# Patient Record
Sex: FEMALE | Race: WHITE | NOT HISPANIC OR LATINO
[De-identification: names, ages, dates, MRNs, and addresses within clinical notes are randomized per-mention and may not be internally consistent; named-entity substitution may affect disease eponyms.]

---

## 2021-10-30 ENCOUNTER — TRANSCRIPTION ENCOUNTER (OUTPATIENT)
Age: 71
End: 2021-10-30

## 2022-07-11 PROBLEM — Z00.00 ENCOUNTER FOR PREVENTIVE HEALTH EXAMINATION: Status: ACTIVE | Noted: 2022-07-11

## 2022-07-12 ENCOUNTER — APPOINTMENT (OUTPATIENT)
Dept: ORTHOPEDIC SURGERY | Facility: CLINIC | Age: 72
End: 2022-07-12

## 2022-07-12 DIAGNOSIS — M89.9 DISORDER OF BONE, UNSPECIFIED: ICD-10-CM

## 2022-07-12 DIAGNOSIS — M72.2 PLANTAR FASCIAL FIBROMATOSIS: ICD-10-CM

## 2022-07-12 DIAGNOSIS — M87.00 IDIOPATHIC ASEPTIC NECROSIS OF UNSPECIFIED BONE: ICD-10-CM

## 2022-07-12 DIAGNOSIS — G89.29 PAIN IN LEFT ANKLE AND JOINTS OF LEFT FOOT: ICD-10-CM

## 2022-07-12 DIAGNOSIS — M25.572 PAIN IN LEFT ANKLE AND JOINTS OF LEFT FOOT: ICD-10-CM

## 2022-07-12 DIAGNOSIS — M94.9 DISORDER OF BONE, UNSPECIFIED: ICD-10-CM

## 2022-07-12 PROCEDURE — 73610 X-RAY EXAM OF ANKLE: CPT | Mod: LT

## 2022-07-12 PROCEDURE — 99203 OFFICE O/P NEW LOW 30 MIN: CPT

## 2022-07-12 NOTE — HISTORY OF PRESENT ILLNESS
[de-identified] : C/o pain in the left ankle. Problem started about 6 months ago. Patient complains of constant pain in the ankle worse when ambulating. She states the problem is worse when getting up from seated position. Patient states the problem is worsening over time. She complains of sharp shooting pain.

## 2022-07-12 NOTE — PHYSICAL EXAM
[Left] : left foot and ankle [NL (40)] : plantar flexion 40 degrees [NL 30)] : inversion 30 degrees [NL (20)] : eversion 20 degrees [5___] : eversion 5[unfilled]/5 [Weight -] : weightbearing [There are no fractures, subluxations or dislocations. No significant abnormalities are seen] : There are no fractures, subluxations or dislocations. No significant abnormalities are seen [Degenerative change] : Degenerative change [] : negative [place] test [[email or web address]] : neal

## 2022-07-13 ENCOUNTER — FORM ENCOUNTER (OUTPATIENT)
Age: 72
End: 2022-07-13

## 2023-05-10 ENCOUNTER — OFFICE (OUTPATIENT)
Dept: URBAN - METROPOLITAN AREA CLINIC 8 | Facility: CLINIC | Age: 73
Setting detail: OPHTHALMOLOGY
End: 2023-05-10
Payer: MEDICARE

## 2023-05-10 DIAGNOSIS — H43.811: ICD-10-CM

## 2023-05-10 DIAGNOSIS — Z96.1: ICD-10-CM

## 2023-05-10 DIAGNOSIS — H26.493: ICD-10-CM

## 2023-05-10 PROCEDURE — 92014 COMPRE OPH EXAM EST PT 1/>: CPT | Performed by: OPHTHALMOLOGY

## 2023-05-10 ASSESSMENT — REFRACTION_CURRENTRX
OS_SPHERE: -2.25
OD_SPHERE: -2.75
OS_CYLINDER: -0.25
OD_CYLINDER: -0.25
OD_VPRISM_DIRECTION: SV
OS_VPRISM_DIRECTION: SV
OD_AXIS: 101
OS_OVR_VA: 20/
OS_AXIS: 168
OD_OVR_VA: 20/

## 2023-05-10 ASSESSMENT — SPHEQUIV_DERIVED
OD_SPHEQUIV: -3.375
OS_SPHEQUIV: 0.125

## 2023-05-10 ASSESSMENT — REFRACTION_MANIFEST
OD_VA1: 20/20
OD_CYLINDER: -0.75
OD_CYLINDER: SPHERE
OS_SPHERE: -2.75
OS_ADD: +2.00
OU_VA: 20/20
OD_SPHERE: PLANO
OD_VA2: 20/20(J1+)
OS_VA1: 20/25
OS_VA2: 20/20(J1+)
OD_ADD: +2.00
OD_SPHERE: -3.00
OS_CYLINDER: SPHERE
OS_SPHERE: PLANO
OD_AXIS: 100
OS_CYLINDER: SPH

## 2023-05-10 ASSESSMENT — REFRACTION_AUTOREFRACTION
OS_SPHERE: +0.25
OS_CYLINDER: -0.25
OD_SPHERE: ++1.25
OD_AXIS: 097
OD_CYLINDER: -0.50
OS_AXIS: 029

## 2023-05-10 ASSESSMENT — KERATOMETRY
OD_K1POWER_DIOPTERS: 43.25
OD_K2POWER_DIOPTERS: 43.25
OS_K1POWER_DIOPTERS: 43.50
OS_K2POWER_DIOPTERS: 44.00
OD_AXISANGLE_DEGREES: 090
OS_AXISANGLE_DEGREES: 083
METHOD_AUTO_MANUAL: AUTO

## 2023-05-10 ASSESSMENT — CONFRONTATIONAL VISUAL FIELD TEST (CVF)
OD_FINDINGS: FULL
OS_FINDINGS: FULL

## 2023-05-10 ASSESSMENT — AXIALLENGTH_DERIVED
OD_AL: 25.0878
OS_AL: 23.4522

## 2023-05-10 ASSESSMENT — VISUAL ACUITY
OS_BCVA: 20/20-2
OD_BCVA: 20/25

## 2024-07-27 ENCOUNTER — OFFICE (OUTPATIENT)
Dept: URBAN - METROPOLITAN AREA CLINIC 8 | Facility: CLINIC | Age: 74
Setting detail: OPHTHALMOLOGY
End: 2024-07-27
Payer: MEDICARE

## 2024-07-27 DIAGNOSIS — H26.493: ICD-10-CM

## 2024-07-27 DIAGNOSIS — Z96.1: ICD-10-CM

## 2024-07-27 DIAGNOSIS — H43.811: ICD-10-CM

## 2024-07-27 PROCEDURE — 92014 COMPRE OPH EXAM EST PT 1/>: CPT | Performed by: OPHTHALMOLOGY

## 2024-07-27 ASSESSMENT — CONFRONTATIONAL VISUAL FIELD TEST (CVF)
OD_FINDINGS: FULL
OS_FINDINGS: FULL

## 2025-05-28 ENCOUNTER — RX ONLY (RX ONLY)
Age: 75
End: 2025-05-28

## 2025-05-28 ENCOUNTER — OFFICE (OUTPATIENT)
Dept: URBAN - METROPOLITAN AREA CLINIC 8 | Facility: CLINIC | Age: 75
Setting detail: OPHTHALMOLOGY
End: 2025-05-28
Payer: MEDICARE

## 2025-05-28 DIAGNOSIS — Z96.1: ICD-10-CM

## 2025-05-28 DIAGNOSIS — H43.811: ICD-10-CM

## 2025-05-28 DIAGNOSIS — H26.491: ICD-10-CM

## 2025-05-28 DIAGNOSIS — H26.492: ICD-10-CM

## 2025-05-28 PROCEDURE — 66821 AFTER CATARACT LASER SURGERY: CPT | Mod: LT | Performed by: OPHTHALMOLOGY

## 2025-05-28 PROCEDURE — 99214 OFFICE O/P EST MOD 30 MIN: CPT | Mod: 57 | Performed by: OPHTHALMOLOGY

## 2025-05-28 ASSESSMENT — CONFRONTATIONAL VISUAL FIELD TEST (CVF)
OD_FINDINGS: FULL
OS_FINDINGS: FULL

## 2025-05-28 ASSESSMENT — REFRACTION_MANIFEST
OD_CYLINDER: SPH
OD_VA2: 20/20(J1+)
OD_CYLINDER: -0.75
OD_ADD: +2.50
OS_CYLINDER: SPH
OS_VA1: 20/30-
OS_SPHERE: -2.75
OD_SPHERE: -3.00
OS_SPHERE: +0.50
OU_VA: 20/20
OD_SPHERE: +0.75
OS_ADD: +2.50
OD_AXIS: 100
OS_VA2: 20/20(J1+)
OS_CYLINDER: SPH
OD_VA1: 20/20-2

## 2025-05-28 ASSESSMENT — REFRACTION_AUTOREFRACTION
OD_AXIS: 109
OD_SPHERE: +1.00
OS_SPHERE: +0.50
OS_CYLINDER: -0.25
OS_AXIS: 015
OD_CYLINDER: -0.25

## 2025-05-28 ASSESSMENT — REFRACTION_CURRENTRX
OS_VPRISM_DIRECTION: SV
OS_OVR_VA: 20/
OD_AXIS: 101
OS_SPHERE: -2.25
OS_AXIS: 168
OD_CYLINDER: -0.25
OD_SPHERE: -2.75
OD_VPRISM_DIRECTION: SV
OS_CYLINDER: -0.25
OD_OVR_VA: 20/

## 2025-05-28 ASSESSMENT — KERATOMETRY
OD_K1POWER_DIOPTERS: 43.00
METHOD_AUTO_MANUAL: AUTO
OS_AXISANGLE_DEGREES: 092
OS_K2POWER_DIOPTERS: 43.75
OS_K1POWER_DIOPTERS: 43.25
OD_AXISANGLE_DEGREES: 048
OD_K2POWER_DIOPTERS: 43.50

## 2025-05-28 ASSESSMENT — VISUAL ACUITY
OS_BCVA: 20/25-2
OD_BCVA: 20/30-

## 2025-07-02 ENCOUNTER — OFFICE (OUTPATIENT)
Dept: URBAN - METROPOLITAN AREA CLINIC 8 | Facility: CLINIC | Age: 75
Setting detail: OPHTHALMOLOGY
End: 2025-07-02
Payer: MEDICARE

## 2025-07-02 ENCOUNTER — RX ONLY (RX ONLY)
Age: 75
End: 2025-07-02

## 2025-07-02 DIAGNOSIS — H26.491: ICD-10-CM

## 2025-07-02 PROCEDURE — 66821 AFTER CATARACT LASER SURGERY: CPT | Mod: 79,RT | Performed by: OPHTHALMOLOGY

## 2025-07-02 ASSESSMENT — KERATOMETRY
OS_K2POWER_DIOPTERS: 44.00
OS_AXISANGLE_DEGREES: 092
OD_AXISANGLE_DEGREES: 044
OD_K2POWER_DIOPTERS: 43.50
METHOD_AUTO_MANUAL: AUTO
OS_K1POWER_DIOPTERS: 43.25
OD_K1POWER_DIOPTERS: 43.00

## 2025-07-02 ASSESSMENT — REFRACTION_AUTOREFRACTION
OD_AXIS: 075
OD_SPHERE: +0.75
OS_SPHERE: +0.50
OS_AXIS: 021
OS_CYLINDER: -0.50
OD_CYLINDER: -0.25

## 2025-07-02 ASSESSMENT — REFRACTION_CURRENTRX
OD_VPRISM_DIRECTION: SV
OS_SPHERE: -2.25
OD_CYLINDER: -0.25
OD_SPHERE: -2.75
OS_VPRISM_DIRECTION: SV
OS_CYLINDER: -0.25
OD_AXIS: 101
OS_AXIS: 168
OD_OVR_VA: 20/
OS_OVR_VA: 20/

## 2025-07-02 ASSESSMENT — REFRACTION_MANIFEST
OD_ADD: +2.50
OU_VA: 20/20
OS_VA1: 20/30-
OS_CYLINDER: SPH
OS_ADD: +2.50
OS_SPHERE: +0.50
OD_VA1: 20/40
OD_CYLINDER: -0.75
OD_CYLINDER: SPH
OS_VA2: 20/20(J1+)
OS_SPHERE: -2.75
OD_VA2: 20/20(J1+)
OD_SPHERE: +0.75
OS_CYLINDER: SPH
OD_SPHERE: -3.00
OD_AXIS: 100

## 2025-07-02 ASSESSMENT — VISUAL ACUITY
OS_BCVA: 20/40
OD_BCVA: 20/30-

## 2025-07-02 ASSESSMENT — CONFRONTATIONAL VISUAL FIELD TEST (CVF)
OD_FINDINGS: FULL
OS_FINDINGS: FULL